# Patient Record
Sex: FEMALE | Race: WHITE | NOT HISPANIC OR LATINO | Employment: OTHER | URBAN - METROPOLITAN AREA
[De-identification: names, ages, dates, MRNs, and addresses within clinical notes are randomized per-mention and may not be internally consistent; named-entity substitution may affect disease eponyms.]

---

## 2018-03-23 DIAGNOSIS — H11.133 CONJUNCTIVAL MELANOSIS OF BOTH EYES: Primary | ICD-10-CM

## 2018-03-27 ENCOUNTER — TELEPHONE (OUTPATIENT)
Dept: OPHTHALMOLOGY | Facility: CLINIC | Age: 68
End: 2018-03-27

## 2018-05-02 ENCOUNTER — CLINICAL SUPPORT (OUTPATIENT)
Dept: OPHTHALMOLOGY | Facility: CLINIC | Age: 68
End: 2018-05-02
Attending: OPHTHALMOLOGY
Payer: MEDICARE

## 2018-05-02 DIAGNOSIS — H11.133 CONJUNCTIVAL MELANOSIS OF BOTH EYES: ICD-10-CM

## 2018-05-02 PROCEDURE — 92285 EXTERNAL OCULAR PHOTOGRAPHY: CPT | Mod: PBBFAC

## 2018-05-02 PROCEDURE — 92285 EXTERNAL OCULAR PHOTOGRAPHY: CPT | Mod: 26,S$PBB,, | Performed by: OPHTHALMOLOGY

## 2019-07-17 DIAGNOSIS — H11.132 CONJUNCTIVAL MELANOSIS OF LEFT EYE: Primary | ICD-10-CM

## 2019-10-09 ENCOUNTER — CLINICAL SUPPORT (OUTPATIENT)
Dept: OPHTHALMOLOGY | Facility: CLINIC | Age: 69
End: 2019-10-09
Attending: OPHTHALMOLOGY
Payer: MEDICARE

## 2019-10-09 DIAGNOSIS — H11.132 CONJUNCTIVAL MELANOSIS OF LEFT EYE: ICD-10-CM

## 2019-10-09 PROCEDURE — 92285 EXTERNAL PHOTOGRAPHY - OU - BOTH EYES: ICD-10-PCS | Mod: 26,S$PBB,, | Performed by: OPHTHALMOLOGY

## 2019-10-09 PROCEDURE — 92285 EXTERNAL OCULAR PHOTOGRAPHY: CPT | Mod: PBBFAC

## 2019-10-09 PROCEDURE — 92285 EXTERNAL OCULAR PHOTOGRAPHY: CPT | Mod: 26,S$PBB,, | Performed by: OPHTHALMOLOGY

## 2020-11-28 ENCOUNTER — OFFICE VISIT (OUTPATIENT)
Dept: URGENT CARE | Facility: CLINIC | Age: 70
End: 2020-11-28
Payer: MEDICARE

## 2020-11-28 VITALS
RESPIRATION RATE: 20 BRPM | HEART RATE: 83 BPM | TEMPERATURE: 98 F | SYSTOLIC BLOOD PRESSURE: 138 MMHG | OXYGEN SATURATION: 97 % | DIASTOLIC BLOOD PRESSURE: 65 MMHG

## 2020-11-28 DIAGNOSIS — R30.0 DYSURIA: ICD-10-CM

## 2020-11-28 DIAGNOSIS — N30.01 ACUTE CYSTITIS WITH HEMATURIA: Primary | ICD-10-CM

## 2020-11-28 LAB
BILIRUB UR QL STRIP: NEGATIVE
GLUCOSE UR QL STRIP: NEGATIVE
KETONES UR QL STRIP: NEGATIVE
LEUKOCYTE ESTERASE UR QL STRIP: POSITIVE
PH, POC UA: 7
POC BLOOD, URINE: POSITIVE
POC NITRATES, URINE: NEGATIVE
PROT UR QL STRIP: NEGATIVE
SP GR UR STRIP: 1.01 (ref 1–1.03)
UROBILINOGEN UR STRIP-ACNC: NORMAL (ref 0.1–1.1)

## 2020-11-28 PROCEDURE — 87077 CULTURE AEROBIC IDENTIFY: CPT

## 2020-11-28 PROCEDURE — 87186 SC STD MICRODIL/AGAR DIL: CPT

## 2020-11-28 PROCEDURE — 87088 URINE BACTERIA CULTURE: CPT

## 2020-11-28 PROCEDURE — 99203 OFFICE O/P NEW LOW 30 MIN: CPT | Mod: 25,S$GLB,, | Performed by: PHYSICIAN ASSISTANT

## 2020-11-28 PROCEDURE — 81003 URINALYSIS AUTO W/O SCOPE: CPT | Mod: QW,S$GLB,, | Performed by: PHYSICIAN ASSISTANT

## 2020-11-28 PROCEDURE — 87086 URINE CULTURE/COLONY COUNT: CPT

## 2020-11-28 PROCEDURE — 81003 POCT URINALYSIS, DIPSTICK, AUTOMATED, W/O SCOPE: ICD-10-PCS | Mod: QW,S$GLB,, | Performed by: PHYSICIAN ASSISTANT

## 2020-11-28 PROCEDURE — 99203 PR OFFICE/OUTPT VISIT, NEW, LEVL III, 30-44 MIN: ICD-10-PCS | Mod: 25,S$GLB,, | Performed by: PHYSICIAN ASSISTANT

## 2020-11-28 RX ORDER — CEPHALEXIN 500 MG/1
500 CAPSULE ORAL EVERY 12 HOURS
Qty: 14 CAPSULE | Refills: 0 | Status: SHIPPED | OUTPATIENT
Start: 2020-11-28 | End: 2020-12-05

## 2020-11-28 RX ORDER — PHENAZOPYRIDINE HYDROCHLORIDE 200 MG/1
200 TABLET, FILM COATED ORAL 3 TIMES DAILY PRN
Qty: 6 TABLET | Refills: 0 | Status: SHIPPED | OUTPATIENT
Start: 2020-11-28 | End: 2020-11-30

## 2020-11-28 NOTE — PROGRESS NOTES
Subjective:       Patient ID: Argentina Cardoza is a 70 y.o. female.    Vitals:  vitals were not taken for this visit.     Chief Complaint: No chief complaint on file.    Patient presents with UTI symptoms that started a few days ago.  Patient reports feeling of bladder spasms while avoiding couple days ago.  Patient drink a lot of water and symptoms had improved but now they are returned.  Patient also reports some urinary frequency and dysuria.  Denies any fever/chills, nausea/vomiting, hematuria.    Urinary Tract Infection   This is a new problem. The current episode started in the past 7 days. The problem occurs every urination. The problem has been unchanged. The quality of the pain is described as aching. The pain is at a severity of 4/10. The pain is mild. There has been no fever. There is no history of pyelonephritis. Associated symptoms include frequency and urgency. Pertinent negatives include no chills, discharge, flank pain, hematuria, hesitancy, nausea or vomiting. She has tried increased fluids for the symptoms. The treatment provided mild relief.       Constitution: Negative for chills.   Gastrointestinal: Negative for nausea and vomiting.   Genitourinary: Positive for frequency and urgency. Negative for flank pain and hematuria.       Objective:      Physical Exam   Constitutional: She is oriented to person, place, and time. She appears well-developed.   HENT:   Head: Normocephalic and atraumatic.   Ears:   Right Ear: External ear normal.   Left Ear: External ear normal.   Nose: Nose normal. No nasal deformity. No epistaxis.   Mouth/Throat: Oropharynx is clear and moist and mucous membranes are normal.   Eyes: Lids are normal.   Neck: Trachea normal, normal range of motion and phonation normal. Neck supple.   Cardiovascular: Normal pulses.   Pulmonary/Chest: Effort normal.   Abdominal: Soft. Normal appearance and bowel sounds are normal. She exhibits no distension. There is no abdominal  tenderness.   Neurological: She is alert and oriented to person, place, and time.   Skin: Skin is warm, dry and intact. Psychiatric: Her speech is normal and behavior is normal.   Nursing note and vitals reviewed.  ,  Results for orders placed or performed in visit on 11/28/20   POCT Urinalysis, Dipstick, Automated, W/O Scope   Result Value Ref Range    POC Blood, Urine Positive (A) Negative    POC Bilirubin, Urine Negative Negative    POC Urobilinogen, Urine normal 0.1 - 1.1    POC Ketones, Urine Negative Negative    POC Protein, Urine Negative Negative    POC Nitrates, Urine Negative Negative    POC Glucose, Urine Negative Negative    pH, UA 7.0     POC Specific Gravity, Urine 1.010 1.003 - 1.029    POC Leukocytes, Urine Positive (A) Negative           Assessment:       1. Acute cystitis with hematuria    2. Dysuria        Plan:       Pt instructed to take full course of antibiotics. Antibiotics may need to be adjusted pending urine culture results. Pt advised to drink plenty of fluids and avoid caffeine or sugary beverages. Recommend to rtc or follow up with primary care provider if symptoms do not improve within 2-3 days or sooner for any new or worsening symptoms.     Acute cystitis with hematuria  -     Urine culture  -     cephALEXin (KEFLEX) 500 MG capsule; Take 1 capsule (500 mg total) by mouth every 12 (twelve) hours. for 7 days  Dispense: 14 capsule; Refill: 0  -     phenazopyridine (PYRIDIUM) 200 MG tablet; Take 1 tablet (200 mg total) by mouth 3 (three) times daily as needed for Pain.  Dispense: 6 tablet; Refill: 0    Dysuria  -     POCT Urinalysis, Dipstick, Automated, W/O Scope  -     phenazopyridine (PYRIDIUM) 200 MG tablet; Take 1 tablet (200 mg total) by mouth 3 (three) times daily as needed for Pain.  Dispense: 6 tablet; Refill: 0      Patient Instructions     Bladder Infection, Female (Adult)    Urine is normally doesn't have any bacteria in it. But bacteria can get into the urinary tract from  "the skin around the rectum. Or they can travel in the blood from elsewhere in the body. Once they are in your urinary tract, they can cause infection in the urethra (urethritis), the bladder (cystitis), or the kidneys (pyelonephritis).  The most common place for an infection is in the bladder. This is called a bladder infection. This is one of the most common infections in women. Most bladder infections are easily treated. They are not serious unless the infection spreads to the kidney.  The phrases "bladder infection," "UTI," and "cystitis" are often used to describe the same thing. But they are not always the same. Cystitis is an inflammation of the bladder. The most common cause of cystitis is an infection.  Symptoms  The infection causes inflammation in the urethra and bladder. This causes many of the symptoms. The most common symptoms of a bladder infection are:  · Pain or burning when urinating  · Having to urinate more often than usual  · Urgent need to urinate  · Only a small amount of urine comes out  · Blood in urine  · Abdominal discomfort. This is usually in the lower abdomen above the pubic bone.  · Cloudy urine  · Strong- or bad-smelling urine  · Unable to urinate (urinary retention)  · Unable to hold urine in (urinary incontinence)  · Fever  · Loss of appetite  · Confusion (in older adults)  Causes  Bladder infections are not contagious. You can't get one from someone else, from a toilet seat, or from sharing a bath.  The most common cause of bladder infections is bacteria from the bowels. The bacteria get onto the skin around the opening of the urethra. From there, they can get into the urine and travel up to the bladder, causing inflammation and infection. This usually happens because of:  · Wiping improperly after urinating. Always wipe from front to back.  · Bowel incontinence  · Pregnancy  · Procedures such as having a catheter inserted  · Older age  · Not emptying your bladder. This can allow " bacteria a chance to grow in your urine.  · Dehydration  · Constipation  · Sex  · Use of a diaphragm for birth control   Treatment  Bladder infections are diagnosed by a urine test. They are treated with antibiotics and usually clear up quickly without complications. Treatment helps prevent a more serious kidney infection.  Medicines  Medicines can help in the treatment of a bladder infection:  · Take antibiotics until they are used up, even if you feel better. It is important to finish them to make sure the infection has cleared.  · You can use acetaminophen or ibuprofen for pain, fever, or discomfort, unless another medicine was prescribed. If you have chronic liver or kidney disease, talk with your healthcare provider before using these medicines. Also talk with your provider if you've ever had a stomach ulcer or gastrointestinal bleeding, or are taking blood-thinner medicines.  · If you are given phenazopydridine to reduce burning with urination, it will cause your urine to become a bright orange color. This can stain clothing.  Care and prevention  These self-care steps can help prevent future infections:  · Drink plenty of fluids to prevent dehydration and flush out your bladder. Do this unless you must restrict fluids for other health reasons, or your doctor told you not to.  · Proper cleaning after going to the bathroom is important. Wipe from front to back after using the toilet to prevent the spread of bacteria.  · Urinate more often. Don't try to hold urine in for a long time.  · Wear loose-fitting clothes and cotton underwear. Avoid tight-fitting pants.  · Improve your diet and prevent constipation. Eat more fresh fruit and vegetables, and fiber, and less junk and fatty foods.  · Avoid sex until your symptoms are gone.  · Avoid caffeine, alcohol, and spicy foods. These can irritate your bladder.  · Urinate right after intercourse to flush out your bladder.  · If you use birth control pills and have  frequent bladder infections, discuss it with your doctor.  Follow-up care  Call your healthcare provider if all symptoms are not gone after 3 days of treatment. This is especially important if you have repeat infections.  If a culture was done, you will be told if your treatment needs to be changed. If directed, you can call to find out the results.  If X-rays were done, you will be told if the results will affect your treatment.  Call 911  Call 911 if any of the following occur:  · Trouble breathing  · Hard to wake up or confusion  · Fainting or loss of consciousness  · Rapid heart rate  When to seek medical advice  Call your healthcare provider right away if any of these occur:  · Fever of 100.4ºF (38.0ºC) or higher, or as directed by your healthcare provider  · Symptoms are not better by the third day of treatment  · Back or belly (abdominal) pain that gets worse  · Repeated vomiting, or unable to keep medicine down  · Weakness or dizziness  · Vaginal discharge  · Pain, redness, or swelling in the outer vaginal area (labia)  Date Last Reviewed: 10/1/2016  © 8510-6417 [employer]. [address] [place] [address]. All rights reserved. This information is not intended as a substitute for professional medical care. Always follow your healthcare professional's instructions.                 UTI   If your condition worsens or fails to improve we recommend that you receive another evaluation at the ER immediately or contact your PCP to discuss your concerns or return here. You must understand that you've received an urgent care treatment only and that you may be released before all your medical problems are known or treated. You the patient will arrange for followup care as instructed.   If you had cultures done it will take 3-5 days to result. We will call you with the result.   If you are are female and on BCP use additional methods to prevent pregnancy while on the antibiotics and for one cycle  after.   Cranberry juice may help. Get the 100% cranberry juice and mix 4 oz of juice with 4 oz of water and drink this 8 oz glass of liquid once a day

## 2020-11-28 NOTE — PATIENT INSTRUCTIONS
"  Bladder Infection, Female (Adult)    Urine is normally doesn't have any bacteria in it. But bacteria can get into the urinary tract from the skin around the rectum. Or they can travel in the blood from elsewhere in the body. Once they are in your urinary tract, they can cause infection in the urethra (urethritis), the bladder (cystitis), or the kidneys (pyelonephritis).  The most common place for an infection is in the bladder. This is called a bladder infection. This is one of the most common infections in women. Most bladder infections are easily treated. They are not serious unless the infection spreads to the kidney.  The phrases "bladder infection," "UTI," and "cystitis" are often used to describe the same thing. But they are not always the same. Cystitis is an inflammation of the bladder. The most common cause of cystitis is an infection.  Symptoms  The infection causes inflammation in the urethra and bladder. This causes many of the symptoms. The most common symptoms of a bladder infection are:  · Pain or burning when urinating  · Having to urinate more often than usual  · Urgent need to urinate  · Only a small amount of urine comes out  · Blood in urine  · Abdominal discomfort. This is usually in the lower abdomen above the pubic bone.  · Cloudy urine  · Strong- or bad-smelling urine  · Unable to urinate (urinary retention)  · Unable to hold urine in (urinary incontinence)  · Fever  · Loss of appetite  · Confusion (in older adults)  Causes  Bladder infections are not contagious. You can't get one from someone else, from a toilet seat, or from sharing a bath.  The most common cause of bladder infections is bacteria from the bowels. The bacteria get onto the skin around the opening of the urethra. From there, they can get into the urine and travel up to the bladder, causing inflammation and infection. This usually happens because of:  · Wiping improperly after urinating. Always wipe from front to " back.  · Bowel incontinence  · Pregnancy  · Procedures such as having a catheter inserted  · Older age  · Not emptying your bladder. This can allow bacteria a chance to grow in your urine.  · Dehydration  · Constipation  · Sex  · Use of a diaphragm for birth control   Treatment  Bladder infections are diagnosed by a urine test. They are treated with antibiotics and usually clear up quickly without complications. Treatment helps prevent a more serious kidney infection.  Medicines  Medicines can help in the treatment of a bladder infection:  · Take antibiotics until they are used up, even if you feel better. It is important to finish them to make sure the infection has cleared.  · You can use acetaminophen or ibuprofen for pain, fever, or discomfort, unless another medicine was prescribed. If you have chronic liver or kidney disease, talk with your healthcare provider before using these medicines. Also talk with your provider if you've ever had a stomach ulcer or gastrointestinal bleeding, or are taking blood-thinner medicines.  · If you are given phenazopydridine to reduce burning with urination, it will cause your urine to become a bright orange color. This can stain clothing.  Care and prevention  These self-care steps can help prevent future infections:  · Drink plenty of fluids to prevent dehydration and flush out your bladder. Do this unless you must restrict fluids for other health reasons, or your doctor told you not to.  · Proper cleaning after going to the bathroom is important. Wipe from front to back after using the toilet to prevent the spread of bacteria.  · Urinate more often. Don't try to hold urine in for a long time.  · Wear loose-fitting clothes and cotton underwear. Avoid tight-fitting pants.  · Improve your diet and prevent constipation. Eat more fresh fruit and vegetables, and fiber, and less junk and fatty foods.  · Avoid sex until your symptoms are gone.  · Avoid caffeine, alcohol, and spicy  foods. These can irritate your bladder.  · Urinate right after intercourse to flush out your bladder.  · If you use birth control pills and have frequent bladder infections, discuss it with your doctor.  Follow-up care  Call your healthcare provider if all symptoms are not gone after 3 days of treatment. This is especially important if you have repeat infections.  If a culture was done, you will be told if your treatment needs to be changed. If directed, you can call to find out the results.  If X-rays were done, you will be told if the results will affect your treatment.  Call 911  Call 911 if any of the following occur:  · Trouble breathing  · Hard to wake up or confusion  · Fainting or loss of consciousness  · Rapid heart rate  When to seek medical advice  Call your healthcare provider right away if any of these occur:  · Fever of 100.4ºF (38.0ºC) or higher, or as directed by your healthcare provider  · Symptoms are not better by the third day of treatment  · Back or belly (abdominal) pain that gets worse  · Repeated vomiting, or unable to keep medicine down  · Weakness or dizziness  · Vaginal discharge  · Pain, redness, or swelling in the outer vaginal area (labia)  Date Last Reviewed: 10/1/2016  © 0471-0165 [employer]. [address] [place] [address], [place] [address]. All rights reserved. This information is not intended as a substitute for professional medical care. Always follow your healthcare professional's instructions.                 UTI   If your condition worsens or fails to improve we recommend that you receive another evaluation at the ER immediately or contact your PCP to discuss your concerns or return here. You must understand that you've received an urgent care treatment only and that you may be released before all your medical problems are known or treated. You the patient will arrange for followup care as instructed.   If you had cultures done it will take 3-5 days to result. We will  call you with the result.   If you are are female and on BCP use additional methods to prevent pregnancy while on the antibiotics and for one cycle after.   Cranberry juice may help. Get the 100% cranberry juice and mix 4 oz of juice with 4 oz of water and drink this 8 oz glass of liquid once a day

## 2020-11-30 LAB — BACTERIA UR CULT: ABNORMAL

## 2021-01-27 ENCOUNTER — OFFICE VISIT (OUTPATIENT)
Dept: URGENT CARE | Facility: CLINIC | Age: 71
End: 2021-01-27
Payer: MEDICARE

## 2021-01-27 ENCOUNTER — HOSPITAL ENCOUNTER (OUTPATIENT)
Dept: RADIOLOGY | Facility: CLINIC | Age: 71
Discharge: HOME | End: 2021-01-27
Attending: PHYSICIAN ASSISTANT
Payer: MEDICARE

## 2021-01-27 VITALS
HEIGHT: 62 IN | WEIGHT: 108 LBS | BODY MASS INDEX: 19.88 KG/M2 | OXYGEN SATURATION: 99 % | TEMPERATURE: 99 F | DIASTOLIC BLOOD PRESSURE: 64 MMHG | HEART RATE: 87 BPM | SYSTOLIC BLOOD PRESSURE: 129 MMHG | RESPIRATION RATE: 20 BRPM

## 2021-01-27 DIAGNOSIS — R14.0 GASSINESS: ICD-10-CM

## 2021-01-27 DIAGNOSIS — R10.9 ABDOMINAL CRAMPING: Primary | ICD-10-CM

## 2021-01-27 DIAGNOSIS — R11.0 NAUSEA: ICD-10-CM

## 2021-01-27 DIAGNOSIS — R10.13 EPIGASTRIC ABDOMINAL PAIN: ICD-10-CM

## 2021-01-27 PROCEDURE — 99214 OFFICE O/P EST MOD 30 MIN: CPT | Mod: S$GLB,,, | Performed by: PHYSICIAN ASSISTANT

## 2021-01-27 PROCEDURE — 99214 PR OFFICE/OUTPT VISIT, EST, LEVL IV, 30-39 MIN: ICD-10-PCS | Mod: S$GLB,,, | Performed by: PHYSICIAN ASSISTANT

## 2021-01-27 PROCEDURE — 74019 RADEX ABDOMEN 2 VIEWS: CPT | Mod: S$GLB,,, | Performed by: RADIOLOGY

## 2021-01-27 PROCEDURE — 74019 XR ABDOMEN FLAT AND ERECT: ICD-10-PCS | Mod: S$GLB,,, | Performed by: RADIOLOGY

## 2021-01-27 RX ORDER — MELOXICAM 15 MG/1
15 TABLET ORAL DAILY
COMMUNITY
Start: 2020-12-12

## 2021-01-27 RX ORDER — ROSUVASTATIN CALCIUM 5 MG/1
TABLET, COATED ORAL
COMMUNITY
Start: 2021-01-11

## 2021-01-27 RX ORDER — KETOCONAZOLE 20 MG/ML
SHAMPOO, SUSPENSION TOPICAL
COMMUNITY
Start: 2021-01-25

## 2021-01-27 RX ORDER — SIMETHICONE 125 MG
125 CAPSULE ORAL 4 TIMES DAILY PRN
Qty: 28 CAPSULE | Refills: 0 | Status: SHIPPED | OUTPATIENT
Start: 2021-01-27 | End: 2023-03-07

## 2021-01-27 RX ORDER — ONDANSETRON 4 MG/1
4 TABLET, ORALLY DISINTEGRATING ORAL EVERY 6 HOURS PRN
Qty: 30 TABLET | Refills: 0 | Status: SHIPPED | OUTPATIENT
Start: 2021-01-27 | End: 2023-03-07

## 2021-01-27 RX ORDER — DICYCLOMINE HYDROCHLORIDE 10 MG/1
10 CAPSULE ORAL
Qty: 28 CAPSULE | Refills: 0 | Status: SHIPPED | OUTPATIENT
Start: 2021-01-27 | End: 2021-02-03

## 2021-02-09 DIAGNOSIS — H11.9 CONJUNCTIVAL LESION: Primary | ICD-10-CM

## 2021-02-26 ENCOUNTER — CLINICAL SUPPORT (OUTPATIENT)
Dept: OPHTHALMOLOGY | Facility: CLINIC | Age: 71
End: 2021-02-26
Attending: OPHTHALMOLOGY
Payer: MEDICARE

## 2021-02-26 DIAGNOSIS — H11.9 CONJUNCTIVAL LESION: ICD-10-CM

## 2021-02-26 PROCEDURE — 92285 EXTERNAL PHOTOGRAPHY - OU - BOTH EYES: ICD-10-PCS | Mod: 26,S$PBB,, | Performed by: OPHTHALMOLOGY

## 2021-02-26 PROCEDURE — 92285 EXTERNAL OCULAR PHOTOGRAPHY: CPT | Mod: PBBFAC

## 2021-02-26 PROCEDURE — 92285 EXTERNAL OCULAR PHOTOGRAPHY: CPT | Mod: 26,S$PBB,, | Performed by: OPHTHALMOLOGY

## 2021-04-28 ENCOUNTER — PATIENT MESSAGE (OUTPATIENT)
Dept: RESEARCH | Facility: HOSPITAL | Age: 71
End: 2021-04-28

## 2022-02-04 ENCOUNTER — TELEPHONE (OUTPATIENT)
Dept: OPHTHALMOLOGY | Facility: CLINIC | Age: 72
End: 2022-02-04
Payer: COMMERCIAL

## 2022-02-04 NOTE — TELEPHONE ENCOUNTER
Returned phone message--L/m stating I haven't received orders for testing from [facility]. Advised her to give there office a call in regards to orders. [email or web address]

## 2022-02-04 NOTE — TELEPHONE ENCOUNTER
----- Message from Barbara Kerr sent at 2/3/2022  9:34 AM CST -----  Contact:  [phone]  This is a patient of Dr. Monteiro. She is calling to schedule testing with you. Please call her back when you are available. [phone]

## 2022-02-11 ENCOUNTER — TELEPHONE (OUTPATIENT)
Dept: OPHTHALMOLOGY | Facility: CLINIC | Age: 72
End: 2022-02-11
Payer: COMMERCIAL

## 2022-02-11 DIAGNOSIS — H11.9 CONJUNCTIVAL LESION: Primary | ICD-10-CM

## 2022-02-23 ENCOUNTER — CLINICAL SUPPORT (OUTPATIENT)
Dept: OPHTHALMOLOGY | Facility: CLINIC | Age: 72
End: 2022-02-23
Payer: MEDICARE

## 2022-02-23 DIAGNOSIS — H11.9 CONJUNCTIVAL LESION: ICD-10-CM

## 2022-02-23 PROCEDURE — 92285 EXTERNAL PHOTOGRAPHY - OU - BOTH EYES: ICD-10-PCS | Mod: 26,S$PBB,, | Performed by: OPHTHALMOLOGY

## 2022-02-23 PROCEDURE — 92285 EXTERNAL OCULAR PHOTOGRAPHY: CPT | Mod: 26,S$PBB,, | Performed by: OPHTHALMOLOGY

## 2022-02-23 PROCEDURE — 92285 EXTERNAL OCULAR PHOTOGRAPHY: CPT | Mod: PBBFAC

## 2022-08-30 PROBLEM — M85.89 OSTEOPENIA OF MULTIPLE SITES: Status: ACTIVE | Noted: 2022-08-30

## 2022-08-30 PROBLEM — E78.5 HYPERLIPIDEMIA: Status: ACTIVE | Noted: 2022-08-30

## 2023-03-07 ENCOUNTER — OFFICE VISIT (OUTPATIENT)
Dept: URGENT CARE | Facility: CLINIC | Age: 73
End: 2023-03-07
Payer: MEDICARE

## 2023-03-07 VITALS
BODY MASS INDEX: 25.42 KG/M2 | RESPIRATION RATE: 16 BRPM | HEIGHT: 56 IN | OXYGEN SATURATION: 100 % | DIASTOLIC BLOOD PRESSURE: 76 MMHG | HEART RATE: 70 BPM | WEIGHT: 113 LBS | SYSTOLIC BLOOD PRESSURE: 158 MMHG | TEMPERATURE: 98 F

## 2023-03-07 DIAGNOSIS — N30.91 CYSTITIS WITH HEMATURIA: Primary | ICD-10-CM

## 2023-03-07 DIAGNOSIS — R03.0 ELEVATED BLOOD PRESSURE READING IN OFFICE WITHOUT DIAGNOSIS OF HYPERTENSION: ICD-10-CM

## 2023-03-07 DIAGNOSIS — R30.0 DYSURIA: ICD-10-CM

## 2023-03-07 DIAGNOSIS — R81 GLYCOSURIA: ICD-10-CM

## 2023-03-07 LAB
BILIRUB UR QL STRIP: NEGATIVE
GLUCOSE SERPL-MCNC: 66 MG/DL (ref 70–110)
GLUCOSE UR QL STRIP: POSITIVE
KETONES UR QL STRIP: NEGATIVE
LEUKOCYTE ESTERASE UR QL STRIP: NEGATIVE
PH, POC UA: 5.5
POC BLOOD, URINE: POSITIVE
POC NITRATES, URINE: NEGATIVE
PROT UR QL STRIP: NEGATIVE
SP GR UR STRIP: >=1.005 (ref 1–1.03)
UROBILINOGEN UR STRIP-ACNC: NORMAL (ref 0.1–1.1)

## 2023-03-07 PROCEDURE — 81003 POCT URINALYSIS, DIPSTICK, AUTOMATED, W/O SCOPE: ICD-10-PCS | Mod: QW,S$GLB,, | Performed by: NURSE PRACTITIONER

## 2023-03-07 PROCEDURE — 81003 URINALYSIS AUTO W/O SCOPE: CPT | Mod: QW,S$GLB,, | Performed by: NURSE PRACTITIONER

## 2023-03-07 PROCEDURE — 99214 OFFICE O/P EST MOD 30 MIN: CPT | Mod: S$GLB,,, | Performed by: NURSE PRACTITIONER

## 2023-03-07 PROCEDURE — 99214 PR OFFICE/OUTPT VISIT, EST, LEVL IV, 30-39 MIN: ICD-10-PCS | Mod: S$GLB,,, | Performed by: NURSE PRACTITIONER

## 2023-03-07 PROCEDURE — 82962 GLUCOSE BLOOD TEST: CPT | Mod: S$GLB,,, | Performed by: NURSE PRACTITIONER

## 2023-03-07 PROCEDURE — 87086 URINE CULTURE/COLONY COUNT: CPT | Performed by: NURSE PRACTITIONER

## 2023-03-07 PROCEDURE — 82962 POCT GLUCOSE, HAND-HELD DEVICE: ICD-10-PCS | Mod: S$GLB,,, | Performed by: NURSE PRACTITIONER

## 2023-03-07 RX ORDER — PHENAZOPYRIDINE HYDROCHLORIDE 100 MG/1
100 TABLET, FILM COATED ORAL 3 TIMES DAILY PRN
Qty: 6 TABLET | Refills: 0 | Status: SHIPPED | OUTPATIENT
Start: 2023-03-07 | End: 2023-03-09

## 2023-03-07 RX ORDER — ESCITALOPRAM OXALATE 5 MG/1
5 TABLET ORAL DAILY
COMMUNITY

## 2023-03-07 RX ORDER — CEPHALEXIN 500 MG/1
500 CAPSULE ORAL EVERY 12 HOURS
Qty: 14 CAPSULE | Refills: 0 | Status: SHIPPED | OUTPATIENT
Start: 2023-03-07 | End: 2023-03-14

## 2023-03-07 NOTE — PATIENT INSTRUCTIONS
PLEASE READ YOUR DISCHARGE INSTRUCTIONS ENTIRELY AS IT CONTAINS IMPORTANT INFORMATION.      Take the antibiotics to completion. Start a OTC probiotic while taking antibiotics to help replenish your GI mohini affected by antibiotic use.     Drink plenty of fluids, avoid caffeine and/or sugary beverages. wipe front to back, take showers not baths, no scented soaps, wear breathable cotton underwear, urinate after sexual intercourse and avoid holding your urination for prolonged periods at a time.     A urine culture was sent. You will be contacted once it results and appropriate action will be taken if needed.     If prescribed pyridium Take the pyridium three times a day with meals. It will turn your urine orange. You do not need to take the whole prescription you can stop this once the pain is better and finish out the antibiotics    Please go to the ER for worsening symptoms including fever, worsening flank pain, vomiting, etc.       Please return or see your primary care doctor if you develop new or worsening symptoms.   Elevated Blood Pressure  Your blood pressure was elevated during your visit to the urgent care.  It was not so high that immediate care was needed but it is recommended that you monitor your blood pressure over the next week or two to make sure that it is not staying elevated.  Please have your blood pressure taken 2-3 times daily at different times of the day.  Write all of those blood pressures down and record the time that they were taken.  Keep all that information and take it with you to see your Primary Care Physician.  If your blood pressure is consistently above 140/90 you will need to follow up with your PCP more quickly    Please arrange follow up with your primary medical clinic as soon as possible. You must understand that you've received an Urgent Care treatment only and that you may be released before all of your medical problems are known or treated. You, the patient, will arrange for  follow up as instructed. If your symptoms worsen or fail to improve you should go to the Emergency Room.  WE CANNOT RULE OUT ALL POSSIBLE CAUSES OF YOUR SYMPTOMS IN THE URGENT CARE SETTING PLEASE GO TO THE ER IF YOU FEELS YOUR CONDITION IS WORSENING OR YOU WOULD LIKE EMERGENT EVALUATION.

## 2023-03-07 NOTE — PROGRESS NOTES
"Subjective:       Patient ID: Argentina Cardoza is a 72 y.o. female.    Vitals:  height is 4' 8" (1.422 m) and weight is 51.3 kg (113 lb). Her temperature is 97.7 °F (36.5 °C). Her blood pressure is 158/76 (abnormal) and her pulse is 70. Her respiration is 16 and oxygen saturation is 100%.     Chief Complaint: Dysuria    Argentina Cardoza is a 72 year old female whom presents to urgent care with complaints of urinary hesitancy and dysuria that began today. Patient reports her PCP suggest she gets a urine culture.     Dysuria   This is a new problem. The current episode started today. The quality of the pain is described as aching (spasm pain). The pain is at a severity of 7/10. There has been no fever. She is Sexually active. There is A history of pyelonephritis. Associated symptoms include frequency, urgency, bubble bath use, constipation (history of constipation) and withholding. Pertinent negatives include no discharge, flank pain, hematuria, hesitancy, nausea or vomiting. She has tried nothing for the symptoms. Her past medical history is significant for recurrent UTIs, a single kidney and a urological procedure (hysterectomy). There is no history of catheterization, diabetes insipidus, diabetes mellitus, hypertension, kidney stones or STD.     Gastrointestinal:  Positive for constipation (history of constipation). Negative for nausea and vomiting.   Genitourinary:  Positive for dysuria, frequency and urgency. Negative for flank pain and hematuria.     Results for orders placed or performed in visit on 03/07/23   POCT Urinalysis, Dipstick, Automated, W/O Scope   Result Value Ref Range    POC Blood, Urine Positive (A) Negative    POC Bilirubin, Urine Negative Negative    POC Urobilinogen, Urine normal 0.1 - 1.1    POC Ketones, Urine Negative Negative    POC Protein, Urine Negative Negative    POC Nitrates, Urine Negative Negative    POC Glucose, Urine Positive (A) Negative    pH, UA 5.5     POC Specific " Gravity, Urine >=1.005 1.003 - 1.029    POC Leukocytes, Urine Negative Negative   POCT Glucose, Hand-Held Device   Result Value Ref Range    POC Glucose 66 (A) 70 - 110 MG/DL       Objective:      Physical Exam   Constitutional: She is oriented to person, place, and time. She appears well-developed. She is cooperative.  Non-toxic appearance. She does not appear ill. No distress.   HENT:   Head: Normocephalic and atraumatic.   Ears:   Right Ear: Hearing, tympanic membrane, external ear and ear canal normal.   Left Ear: Hearing, tympanic membrane, external ear and ear canal normal.   Nose: Nose normal. No mucosal edema, rhinorrhea or nasal deformity. No epistaxis. Right sinus exhibits no maxillary sinus tenderness and no frontal sinus tenderness. Left sinus exhibits no maxillary sinus tenderness and no frontal sinus tenderness.   Mouth/Throat: Uvula is midline, oropharynx is clear and moist and mucous membranes are normal. No trismus in the jaw. Normal dentition. No uvula swelling. No oropharyngeal exudate, posterior oropharyngeal edema or posterior oropharyngeal erythema.   Eyes: Conjunctivae and lids are normal. No scleral icterus.   Neck: Trachea normal and phonation normal. Neck supple. No edema present. No erythema present. No neck rigidity present.   Cardiovascular: Normal rate, regular rhythm, normal heart sounds and normal pulses.   Pulmonary/Chest: Effort normal and breath sounds normal. No respiratory distress. She has no decreased breath sounds. She has no rhonchi.   Abdominal: Normal appearance. She exhibits no distension. There is no abdominal tenderness. There is no left CVA tenderness and no right CVA tenderness.   Musculoskeletal: Normal range of motion.         General: No deformity. Normal range of motion.   Neurological: She is alert and oriented to person, place, and time. She exhibits normal muscle tone. Coordination normal.   Skin: Skin is warm, dry, intact, not diaphoretic and not pale.    Psychiatric: Her speech is normal and behavior is normal. Judgment and thought content normal.   Nursing note and vitals reviewed.      Assessment:       1. Cystitis with hematuria    2. Elevated blood pressure reading in office without diagnosis of hypertension    3. Dysuria    4. Glycosuria          Plan:         Cystitis with hematuria  -     cephALEXin (KEFLEX) 500 MG capsule; Take 1 capsule (500 mg total) by mouth every 12 (twelve) hours. for 7 days  Dispense: 14 capsule; Refill: 0    Elevated blood pressure reading in office without diagnosis of hypertension    Dysuria  -     POCT Urinalysis, Dipstick, Automated, W/O Scope  -     Urine culture  -     phenazopyridine (PYRIDIUM) 100 MG tablet; Take 1 tablet (100 mg total) by mouth 3 (three) times daily as needed for Pain.  Dispense: 6 tablet; Refill: 0    Glycosuria  -     POCT Glucose, Hand-Held Device           Medical Decision Making:   Differential Diagnosis:   UTI cystitis, pyelonephritis, dysuria, STD, PID, Vaginitis, Urethritis, Painful bladder syndrome, Vaginal yeast infection,     Clinical Tests:   Lab Tests: Ordered and Reviewed       <> Summary of Lab: Results for orders placed or performed in visit on 03/07/23  POCT Urinalysis, Dipstick, Automated, W/O Scope  Result Value Ref Range   POC Blood, Urine Positive (A) Negative   POC Glucose, Urine Positive (A) Negative   pH, UA 5.5    POC Leukocytes, Urine Negative Negative  POCT Glucose, Hand-Held Device  Result Value Ref Range   POC Glucose 66 (A) 70 - 110 MG/DL          Urgent Care Management:  Previous encounters and labs were reviewed. Reviewed abnormal urinalysis with patient who verbalized understanding.  Discussed diagnosis and treatment plan while  also discussed over-the-counter medication remedies to help support current symptoms.Patient instructed to take full course of antibiotics.  Antibiotics may need to be adjusted pending urine culture results. Patient advised to drink plenty of fluids  and avoid caffeine or sugary beverages. Recommend to  follow up with primary care provider if symptoms do not improve within 2-3 days or sooner for any new or worsening symptoms. Patient reports symptoms are consistent with her symptoms she experience when she has a UTI. Additional plan of care as outlined above. Discussed the importance of a low sodium/ heart healthy diet and exercise to achieve overall optimal health. Patient reports her blood pressure is usually within normal limits while at home.  Patient instructed to keep a blood pressure log and bring in to her next appointment with her primary care provider. Patient instructed to follow up sooner if her blood pressure is consistently greater than 140/90. Patient educational handout also included in discharge paperwork and was given to patient who verbalized understanding and agrees with the plan of care.  Patient denies any further questions or concerns at this time.  Patient exits exam room in no acute distress.        Patient Instructions   PLEASE READ YOUR DISCHARGE INSTRUCTIONS ENTIRELY AS IT CONTAINS IMPORTANT INFORMATION.      Take the antibiotics to completion. Start a OTC probiotic while taking antibiotics to help replenish your GI mohini affected by antibiotic use.     Drink plenty of fluids, avoid caffeine and/or sugary beverages. wipe front to back, take showers not baths, no scented soaps, wear breathable cotton underwear, urinate after sexual intercourse and avoid holding your urination for prolonged periods at a time.     A urine culture was sent. You will be contacted once it results and appropriate action will be taken if needed.     If prescribed pyridium Take the pyridium three times a day with meals. It will turn your urine orange. You do not need to take the whole prescription you can stop this once the pain is better and finish out the antibiotics    Please go to the ER for worsening symptoms including fever, worsening flank pain,  vomiting, etc.       Please return or see your primary care doctor if you develop new or worsening symptoms.   Elevated Blood Pressure  Your blood pressure was elevated during your visit to the urgent care.  It was not so high that immediate care was needed but it is recommended that you monitor your blood pressure over the next week or two to make sure that it is not staying elevated.  Please have your blood pressure taken 2-3 times daily at different times of the day.  Write all of those blood pressures down and record the time that they were taken.  Keep all that information and take it with you to see your Primary Care Physician.  If your blood pressure is consistently above 140/90 you will need to follow up with your PCP more quickly    Please arrange follow up with your primary medical clinic as soon as possible. You must understand that you've received an Urgent Care treatment only and that you may be released before all of your medical problems are known or treated. You, the patient, will arrange for follow up as instructed. If your symptoms worsen or fail to improve you should go to the Emergency Room.  WE CANNOT RULE OUT ALL POSSIBLE CAUSES OF YOUR SYMPTOMS IN THE URGENT CARE SETTING PLEASE GO TO THE ER IF YOU FEELS YOUR CONDITION IS WORSENING OR YOU WOULD LIKE EMERGENT EVALUATION.

## 2023-03-09 ENCOUNTER — TELEPHONE (OUTPATIENT)
Dept: URGENT CARE | Facility: CLINIC | Age: 73
End: 2023-03-09
Payer: COMMERCIAL

## 2023-03-09 LAB — BACTERIA UR CULT: NO GROWTH

## 2023-03-09 NOTE — TELEPHONE ENCOUNTER
Discussed negative urine cultures with patient today.  Discussed need follow-up with PCP for continued care.  Discussed no need to continue taking oral antibiotics at this time as there is no further need.  Patient verbalized understanding and agrees with plan.  Patient has been followed by PCP for Urology complains up into this point and encouraged to continue care. Patient verbalized understanding and agrees with plan.

## 2023-06-23 ENCOUNTER — HOSPITAL ENCOUNTER (OUTPATIENT)
Dept: RADIOLOGY | Facility: CLINIC | Age: 73
Discharge: HOME | End: 2023-06-23
Attending: PHYSICIAN ASSISTANT
Payer: MEDICARE

## 2023-06-23 ENCOUNTER — OFFICE VISIT (OUTPATIENT)
Dept: URGENT CARE | Facility: CLINIC | Age: 73
End: 2023-06-23
Payer: MEDICARE

## 2023-06-23 ENCOUNTER — PATIENT MESSAGE (OUTPATIENT)
Dept: URGENT CARE | Facility: CLINIC | Age: 73
End: 2023-06-23

## 2023-06-23 VITALS
DIASTOLIC BLOOD PRESSURE: 67 MMHG | SYSTOLIC BLOOD PRESSURE: 126 MMHG | TEMPERATURE: 98 F | WEIGHT: 113 LBS | HEIGHT: 56 IN | RESPIRATION RATE: 16 BRPM | OXYGEN SATURATION: 100 % | BODY MASS INDEX: 25.42 KG/M2 | HEART RATE: 68 BPM
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
## 2023-06-23 DIAGNOSIS — T07.XXXA ABRASIONS OF MULTIPLE SITES: ICD-10-CM
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
## 2023-06-23 DIAGNOSIS — S32.512A CLOSED FRACTURE OF SUPERIOR RAMUS OF LEFT PUBIS, INITIAL ENCOUNTER: ICD-10-CM

## 2023-06-23 DIAGNOSIS — S76.012A HIP STRAIN, LEFT, INITIAL ENCOUNTER: ICD-10-CM

## 2023-06-23 DIAGNOSIS — S43.402A SPRAIN OF LEFT SHOULDER, UNSPECIFIED SHOULDER SPRAIN TYPE, INITIAL ENCOUNTER: ICD-10-CM

## 2023-06-23 DIAGNOSIS — S51.012A LACERATION OF LEFT ELBOW, INITIAL ENCOUNTER: ICD-10-CM

## 2023-06-23 PROCEDURE — 73080 XR ELBOW COMPLETE 3 VIEW LEFT: ICD-10-PCS | Mod: LT,S$GLB,, | Performed by: RADIOLOGY

## 2023-06-23 PROCEDURE — 99214 OFFICE O/P EST MOD 30 MIN: CPT | Mod: 25,S$GLB,, | Performed by: PHYSICIAN ASSISTANT

## 2023-06-23 PROCEDURE — 12031 INTMD RPR S/A/T/EXT 2.5 CM/<: CPT | Mod: S$GLB,,, | Performed by: PHYSICIAN ASSISTANT

## 2023-06-23 PROCEDURE — 99214 PR OFFICE/OUTPT VISIT, EST, LEVL IV, 30-39 MIN: ICD-10-PCS | Mod: 25,S$GLB,, | Performed by: PHYSICIAN ASSISTANT

## 2023-06-23 PROCEDURE — 73502 X-RAY EXAM HIP UNI 2-3 VIEWS: CPT | Mod: LT,S$GLB,, | Performed by: RADIOLOGY

## 2023-06-23 PROCEDURE — 12031 LACERATION REPAIR: ICD-10-PCS | Mod: S$GLB,,, | Performed by: PHYSICIAN ASSISTANT

## 2023-06-23 PROCEDURE — 73080 X-RAY EXAM OF ELBOW: CPT | Mod: LT,S$GLB,, | Performed by: RADIOLOGY

## 2023-06-23 PROCEDURE — 73502 XR HIP WITH PELVIS WHEN PERFORMED, 2 OR 3 VIEWS LEFT: ICD-10-PCS | Mod: LT,S$GLB,, | Performed by: RADIOLOGY

## 2023-06-23 RX ORDER — HYDROCODONE BITARTRATE AND ACETAMINOPHEN 7.5; 325 MG/1; MG/1
1 TABLET ORAL EVERY 6 HOURS PRN
Qty: 28 TABLET | Refills: 0 | Status: SHIPPED | OUTPATIENT
Start: 2023-06-23 | End: 2023-06-30

## 2023-06-23 RX ORDER — AMLODIPINE BESYLATE 2.5 MG/1
2.5 TABLET ORAL DAILY
COMMUNITY

## 2023-06-23 RX ORDER — MUPIROCIN 20 MG/G
OINTMENT TOPICAL 3 TIMES DAILY
Qty: 30 G | Refills: 2 | Status: SHIPPED | OUTPATIENT
Start: 2023-06-23

## 2023-06-23 RX ORDER — MUPIROCIN 20 MG/G
OINTMENT TOPICAL
Status: COMPLETED | OUTPATIENT
Start: 2023-06-23 | End: 2023-06-23

## 2023-06-23 RX ADMIN — MUPIROCIN: 20 OINTMENT TOPICAL at 11:06

## 2023-06-23 NOTE — PATIENT INSTRUCTIONS
You will need follow up in 1-2 weeks with orthopedist. Referral has been placed. Call  to schedule. You can weight bear as tolerated but may require a walker.    Clean wounds twice daily with regular dove or dial soap and water. Gently pat dry. Apply bactroban ointment and nonstick gauze 2x daily. Monitor for any spreading redness/swelling/purulent drainage - needs repeat evaluation. Staple removal in about 10-12 days.     Can take IBUPROFEN for pain and NORCO every 6 hours as needed for breakthrough pain (may cause drowsiness).

## 2023-06-23 NOTE — PROGRESS NOTES
"Subjective:      Patient ID: Argentina Cardoza is a 72 y.o. female.    Vitals:  height is 4' 8" (1.422 m) and weight is 51.3 kg (113 lb). Her oral temperature is 98.3 °F (36.8 °C). Her blood pressure is 126/67 and her pulse is 68. Her respiration is 16 and oxygen saturation is 100%.     Chief Complaint: Fall    Pt is a 73 yo female who presents to the clinic today with left shoulder pain, left elbow pain, left hip and pelvic pain, and left knee abrasion that began after a fall about 30 minutes ago. Pt states that she was walking her dog this morning, when the dog saw a squirrel and ran causing her to fall and get dragged by the leash. No head injury or LOC. Has abrasions and possible laceration to left elbow, abrasions to left hip and left knee. Tetanus is UTD. Has been ambulatory since incident     Fall  The accident occurred Less than 1 hour ago. The fall occurred while walking. Distance fallen: ground level. Impact surface: the street. The point of impact was the left shoulder, left hip, left knee and left elbow. The pain is present in the left elbow, left hip, left shoulder and left knee. The pain is at a severity of 4/10. The pain is mild. The symptoms are aggravated by standing and movement. Pertinent negatives include no abdominal pain, bowel incontinence, fever, headaches, hearing loss, hematuria, loss of consciousness, nausea, numbness, tingling, visual change or vomiting. She has tried nothing for the symptoms.     Constitution: Negative for chills, sweating, fatigue and fever.   HENT:  Negative for congestion, sinus pressure and sore throat.    Eyes:  Negative for eye discharge, eye itching and eye pain.   Respiratory:  Negative for cough, sputum production and shortness of breath.    Gastrointestinal:  Negative for abdominal pain, nausea, vomiting, diarrhea and bowel incontinence.   Genitourinary:  Negative for hematuria.   Musculoskeletal:  Positive for pain (left shoulder, left elbow, left hip and " left knee), trauma and joint pain. Negative for joint swelling and abnormal ROM of joint.   Skin:  Positive for wound, abrasion and laceration.   Neurological:  Negative for headaches, loss of consciousness and numbness.    Objective:     Physical Exam   Constitutional: She is oriented to person, place, and time. She appears well-developed. She is cooperative.  Non-toxic appearance. She does not appear ill. No distress.   HENT:   Head: Normocephalic and atraumatic. Head is without abrasion, without contusion and without laceration.   Ears:   Right Ear: Hearing, external ear and ear canal normal. No hemotympanum.   Left Ear: Hearing, external ear and ear canal normal. No hemotympanum.   Nose: Nose normal. No mucosal edema, rhinorrhea or nasal deformity. No epistaxis. Right sinus exhibits no maxillary sinus tenderness and no frontal sinus tenderness. Left sinus exhibits no maxillary sinus tenderness and no frontal sinus tenderness.   Mouth/Throat: Uvula is midline, oropharynx is clear and moist and mucous membranes are normal. No trismus in the jaw. Normal dentition. No uvula swelling. No posterior oropharyngeal erythema.   Eyes: Conjunctivae, EOM and lids are normal. Pupils are equal, round, and reactive to light. Right eye exhibits no discharge. Left eye exhibits no discharge. No scleral icterus.   Neck: Trachea normal and phonation normal. Neck supple. No tracheal deviation present. No neck rigidity present. No spinous process tenderness present. No muscular tenderness present.   Cardiovascular: Normal rate, regular rhythm, normal heart sounds and normal pulses.   Pulmonary/Chest: Effort normal and breath sounds normal. No respiratory distress.   Abdominal: Normal appearance and bowel sounds are normal. She exhibits no distension and no mass. Soft. There is no abdominal tenderness.   Musculoskeletal: Normal range of motion.         General: No deformity. Normal range of motion.      Comments: FROM left shoulder and  left knee without swelling or deformities. Just distal to left knee is superficial abrasion.    Left hip superficial road rash abrasion FROM active and passive but reproduces pain. No shortening. Posterior pelvic pain. No ecchymosis. 2+ DP pulse and normal sensation distally.    Left elbow with 3 inch superficial road rash abrasion with two separate deeper areas about 1 cm each extending to fascia but not through fascia. No apparent foreign body. FROM no deformities. 2+ radial and ulnar LUE. Normal sensation and ROM distally    Neurological: She is alert and oriented to person, place, and time. She has normal strength. No cranial nerve deficit or sensory deficit. She exhibits normal muscle tone. She displays no seizure activity. Coordination normal. GCS eye subscore is 4. GCS verbal subscore is 5. GCS motor subscore is 6.   Skin: Skin is warm, dry, intact, not diaphoretic and not pale. Capillary refill takes less than 2 seconds. No abrasion, No burn, No bruising and No ecchymosis   Psychiatric: Her speech is normal and behavior is normal. Judgment and thought content normal.   Nursing note and vitals reviewed.    XR ELBOW COMPLETE 3 VIEW LEFT    Result Date: 6/23/2023  EXAMINATION: XR ELBOW COMPLETE 3 VIEW LEFT CLINICAL HISTORY: Unspecified fall, initial encounter TECHNIQUE: AP, lateral, and oblique views of the left elbow were performed. COMPARISON: None FINDINGS: There is some subcutaneous soft tissue gas present overlying the dorsal aspect of the olecranon suggesting probable laceration with some associated local soft tissue edema suspected.  No acute fractures or dislocations visualized.  No definite joint effusion demonstrated.  Joint spaces are preserved.     Suspected soft tissue injury as above Electronically signed by: Randell Peraza MD Date:    06/23/2023 Time:    10:28    X-Ray Hip 2 or 3 views Left (with Pelvis when performed)    Result Date: 6/23/2023  EXAMINATION: XR HIP WITH PELVIS WHEN PERFORMED, 2  OR 3 VIEWS LEFT CLINICAL HISTORY: Unspecified fall, initial encounter TECHNIQUE: AP view of the pelvis and frog leg lateral view of the left hip were performed. COMPARISON: None FINDINGS: There is a nondisplaced fracture deformity seen involving the left superior pubic ramus near the pubic symphysis which is potentially subacute in nature.  Remaining visualized osseous structures appear intact.  No evidence of dislocation.  Hip joint spaces are preserved.     Left superior pubic ramus fracture as above This report was flagged in Epic as abnormal. Electronically signed by: Randell Peraza MD Date:    06/23/2023 Time:    10:26     I have reviewed the patient's previous visits, labs and images.    Assessment:     1. Fall, initial encounter    2. Closed fracture of superior ramus of left pubis, initial encounter    3. Laceration of left elbow, initial encounter    4. Abrasions of multiple sites    5. Hip strain, left, initial encounter    6. Sprain of left shoulder, unspecified shoulder sprain type, initial encounter        Plan:         Fall, initial encounter  -     X-Ray Hip 2 or 3 views Left (with Pelvis when performed); Future; Expected date: 06/23/2023  -     XR ELBOW COMPLETE 3 VIEW LEFT; Future; Expected date: 06/23/2023    Closed fracture of superior ramus of left pubis, initial encounter  -     X-Ray Hip 2 or 3 views Left (with Pelvis when performed); Future; Expected date: 06/23/2023  -     Ambulatory referral/consult to Orthopedics  -     HYDROcodone-acetaminophen (NORCO) 7.5-325 mg per tablet; Take 1 tablet by mouth every 6 (six) hours as needed for Pain.  Dispense: 28 tablet; Refill: 0    Laceration of left elbow, initial encounter  -     XR ELBOW COMPLETE 3 VIEW LEFT; Future; Expected date: 06/23/2023  -     mupirocin 2 % ointment  -     mupirocin (BACTROBAN) 2 % ointment; Apply topically 3 (three) times daily.  Dispense: 30 g; Refill: 2    Abrasions of multiple sites  -     mupirocin 2 % ointment  -      mupirocin (BACTROBAN) 2 % ointment; Apply topically 3 (three) times daily.  Dispense: 30 g; Refill: 2    Hip strain, left, initial encounter  -     X-Ray Hip 2 or 3 views Left (with Pelvis when performed); Future; Expected date: 06/23/2023    Sprain of left shoulder, unspecified shoulder sprain type, initial encounter    Mirella Hernandez PA-C  [facility]       Patient Instructions   You will need follow up in 1-2 weeks with orthopedist. Referral has been placed. Call  to schedule. You can weight bear as tolerated but may require a walker.    Clean wounds twice daily with regular dove or dial soap and water. Gently pat dry. Apply bactroban ointment and nonstick gauze 2x daily. Monitor for any spreading redness/swelling/purulent drainage - needs repeat evaluation. Staple removal in about 10-12 days.     Can take IBUPROFEN for pain and NORCO every 6 hours as needed for breakthrough pain (may cause drowsiness).         Medical Decision Making:   Initial Assessment:   Multiple abrasions and MSK pain after ground level fall.   Differential Diagnosis:   Fx, strain, abrasions  Independently Interpreted Test(s):   I have ordered and independently interpreted X-rays - see prior notes.  Clinical Tests:   Radiological Study: Ordered and Reviewed  Urgent Care Management:  Wounds cleaned, bactroban and nonstick bandage placed. 2 staples to small separate elbow deeper lacs.   Other:   I have discussed this case with another health care provider.       <> Summary of the Discussion: Case discussed with Dr. Duarte kent on call. States fracture is stable. Recommends weight bear as tolerated have follow up in 1-2 weeks.

## 2023-06-23 NOTE — PROCEDURES
Laceration Repair    Date/Time: 6/23/2023 9:30 AM  Performed by: Mirella Hernandez PA-C  Authorized by: Mirella Hernandez PA-C   Body area: upper extremity  Location details: left elbow  Laceration length: 1 (2 x 1 cm lacerations) cm  Foreign bodies: no foreign bodies  Tendon involvement: none  Nerve involvement: none  Vascular damage: no  Preparation: Patient was prepped and draped in the usual sterile fashion.  Irrigation solution: saline (with 1:1 hibiclens)  Irrigation method: syringe  Amount of cleaning: extensive  Debridement: minimal  Degree of undermining: minimal  Skin closure: staples  Number of sutures: 2 staples.  Approximation difficulty: simple  Dressing: antibiotic ointment and petrolatum gauze  Patient tolerance: Patient tolerated the procedure well with no immediate complications

## 2023-06-27 ENCOUNTER — TELEPHONE (OUTPATIENT)
Dept: ORTHOPEDICS | Facility: CLINIC | Age: 73
End: 2023-06-27
Payer: MEDICARE

## 2023-06-27 NOTE — TELEPHONE ENCOUNTER
Spoke with patient and she is following up with an Orthopedic physician that she has previously seen. Understanding verbalized.

## 2023-06-27 NOTE — TELEPHONE ENCOUNTER
----- Message from Allegra Kwon MA sent at 6/27/2023 10:13 AM CDT -----  [facility] 06/23/23 Closed fracture of superior ramus of left pubis

## 2023-07-03 ENCOUNTER — OFFICE VISIT (OUTPATIENT)
Dept: URGENT CARE | Facility: CLINIC | Age: 73
End: 2023-07-03
Payer: MEDICARE

## 2023-07-03 VITALS
RESPIRATION RATE: 16 BRPM | DIASTOLIC BLOOD PRESSURE: 65 MMHG | HEART RATE: 71 BPM | HEIGHT: 56 IN | SYSTOLIC BLOOD PRESSURE: 136 MMHG | BODY MASS INDEX: 25.42 KG/M2 | OXYGEN SATURATION: 100 % | TEMPERATURE: 98 F | WEIGHT: 113 LBS

## 2023-07-03 DIAGNOSIS — Z48.02 ENCOUNTER FOR STAPLE REMOVAL: Primary | ICD-10-CM

## 2023-07-03 PROCEDURE — 99499 UNLISTED E&M SERVICE: CPT | Mod: S$GLB,,, | Performed by: PHYSICIAN ASSISTANT

## 2023-07-03 PROCEDURE — 99499 NO LOS: ICD-10-PCS | Mod: S$GLB,,, | Performed by: PHYSICIAN ASSISTANT

## 2023-07-03 PROCEDURE — 99024 POSTOP FOLLOW-UP VISIT: CPT | Mod: S$GLB,POP,, | Performed by: PHYSICIAN ASSISTANT

## 2023-07-03 PROCEDURE — 99024 STAPLE REMOVAL: ICD-10-PCS | Mod: S$GLB,POP,, | Performed by: PHYSICIAN ASSISTANT

## 2023-07-03 NOTE — PROGRESS NOTES
"Subjective:      Patient ID: Argentina Cardoza is a 72 y.o. female.    Vitals:  height is 4' 8" (1.422 m) and weight is 51.3 kg (113 lb). Her oral temperature is 98.2 °F (36.8 °C). Her blood pressure is 136/65 and her pulse is 71. Her respiration is 16 and oxygen saturation is 100%.     Chief Complaint: No chief complaint on file.    Pt presents to the clinic today for staple removal from visit on 06/23. States area is healing well. No pain, swelling, fever. Small amount of yellowish drainage sometimes on bandage. Still applying Bactroban.     Suture / Staple Removal  The sutures were placed 7 to 10 days ago. She tried nothing since the wound repair. There has been colored (yellow) discharge from the wound. There is no redness present. There is no swelling present. There is no pain present. She has no difficulty moving the affected extremity or digit.     Constitution: Negative.   Gastrointestinal: Negative.    Musculoskeletal:  Negative for joint pain, joint swelling and abnormal ROM of joint.   Skin:  Positive for wound.   Neurological: Negative.     Objective:     Physical Exam   Constitutional: She appears well-developed.  Non-toxic appearance. She does not appear ill. No distress.   HENT:   Head: Normocephalic and atraumatic.   Ears:   Right Ear: External ear normal.   Left Ear: External ear normal.   Nose: Nose normal.   Eyes: Conjunctivae and EOM are normal.   Neck: Neck supple.   Pulmonary/Chest: Effort normal.   Abdominal: Normal appearance.   Musculoskeletal: Normal range of motion.         General: Normal range of motion.   Neurological: She is alert and at baseline. She displays no weakness.   Skin: Skin is warm, dry, not diaphoretic, not pale and no rash.        Psychiatric: Her behavior is normal.     Assessment:     1. Encounter for staple removal        Plan:     Continue bactroban 1-2 times per day. Keep area clean and dry. Monitor for any signs of infection.     Encounter for staple removal  - "     Staple Removal

## 2023-07-03 NOTE — PROCEDURES
Staple Removal    Date/Time: 7/3/2023 9:15 AM  Location procedure was performed: [facility]  Performed by: Sweetie Bee PA-C  Authorized by: Sweetie Bee PA-C   Body area: upper extremity  Location details: left elbow  Wound Appearance: clean, well healed, moist, nontender and nonpurulent  Staples Removed: 2  Post-removal: no dressing applied  Facility: sutures placed in this facility  Complications: No  Patient tolerance: Patient tolerated the procedure well with no immediate complications

## 2023-07-06 ENCOUNTER — TELEPHONE (OUTPATIENT)
Dept: URGENT CARE | Facility: CLINIC | Age: 73
End: 2023-07-06
Payer: MEDICARE

## 2023-08-03 ENCOUNTER — PATIENT MESSAGE (OUTPATIENT)
Dept: RESEARCH | Facility: HOSPITAL | Age: 73
End: 2023-08-03
Payer: MEDICARE

## 2024-12-19 ENCOUNTER — HOSPITAL ENCOUNTER (OUTPATIENT)
Dept: RADIOLOGY | Facility: CLINIC | Age: 74
Discharge: HOME | End: 2024-12-19
Attending: NURSE PRACTITIONER
Payer: MEDICARE

## 2024-12-19 ENCOUNTER — OFFICE VISIT (OUTPATIENT)
Dept: URGENT CARE | Facility: CLINIC | Age: 74
End: 2024-12-19
Payer: MEDICARE

## 2024-12-19 VITALS
DIASTOLIC BLOOD PRESSURE: 82 MMHG | SYSTOLIC BLOOD PRESSURE: 133 MMHG | RESPIRATION RATE: 18 BRPM | HEART RATE: 80 BPM | HEIGHT: 62 IN | WEIGHT: 110 LBS | OXYGEN SATURATION: 98 % | TEMPERATURE: 98 F | BODY MASS INDEX: 20.24 KG/M2

## 2024-12-19 DIAGNOSIS — R05.1 ACUTE COUGH: ICD-10-CM

## 2024-12-19 DIAGNOSIS — R09.89 CHEST CONGESTION: ICD-10-CM

## 2024-12-19 DIAGNOSIS — R52 BODY ACHES: ICD-10-CM

## 2024-12-19 DIAGNOSIS — R05.1 ACUTE COUGH: Primary | ICD-10-CM

## 2024-12-19 DIAGNOSIS — R09.82 POST-NASAL DRIP: ICD-10-CM

## 2024-12-19 PROCEDURE — 99214 OFFICE O/P EST MOD 30 MIN: CPT | Mod: S$GLB,,, | Performed by: NURSE PRACTITIONER

## 2024-12-19 PROCEDURE — 71046 X-RAY EXAM CHEST 2 VIEWS: CPT | Mod: S$GLB,,, | Performed by: RADIOLOGY

## 2024-12-19 RX ORDER — PROMETHAZINE HYDROCHLORIDE AND DEXTROMETHORPHAN HYDROBROMIDE 6.25; 15 MG/5ML; MG/5ML
5 SYRUP ORAL EVERY 6 HOURS PRN
Qty: 118 ML | Refills: 0 | Status: SHIPPED | OUTPATIENT
Start: 2024-12-19 | End: 2024-12-26

## 2024-12-19 RX ORDER — BENZONATATE 200 MG/1
200 CAPSULE ORAL 3 TIMES DAILY PRN
Qty: 30 CAPSULE | Refills: 0 | Status: SHIPPED | OUTPATIENT
Start: 2024-12-19 | End: 2024-12-29

## 2024-12-19 RX ORDER — ALBUTEROL SULFATE 90 UG/1
1-2 INHALANT RESPIRATORY (INHALATION) EVERY 4 HOURS PRN
Qty: 18 G | Refills: 0 | Status: SHIPPED | OUTPATIENT
Start: 2024-12-19

## 2024-12-19 NOTE — PROGRESS NOTES
"Subjective:      Patient ID: [place] Cardoza is a 74 y.o. female.    Vitals:  height is 5' 2" (1.575 m) and weight is 49.9 kg (110 lb). Her oral temperature is 98.1 °F (36.7 °C). Her blood pressure is 133/82 and her pulse is 80. Her respiration is 18 and oxygen saturation is 98%.     Chief Complaint: Cough    74 year old female presents for evaluation of cough, chest congestion, and upper back pain attributed to excessive coughing. Symptom onset 10 days ago with Laryngitis which initially improved but worsened over the past week. Complains of increased cough at night. OTC Tylenol, Advil, Robitussin DM, Sudaphed, and Phenylephrine with some relief.     Cough  This is a new problem. The current episode started 1 to 4 weeks ago. The problem occurs constantly. The cough is Productive of sputum. Associated symptoms include postnasal drip. Pertinent negatives include no chills, ear pain, fever, myalgias, sore throat or shortness of breath. The symptoms are aggravated by lying down. She has tried OTC cough suppressant (Robitussin,Sudafed) for the symptoms. The treatment provided mild relief. Her past medical history is significant for bronchitis and pneumonia. There is no history of asthma, bronchiectasis, COPD, emphysema or environmental allergies.       Constitution: Positive for appetite change and fatigue (mild). Negative for chills, sweating and fever.   HENT:  Positive for congestion (with onset now resolved) and postnasal drip. Negative for ear pain, sinus pain, sinus pressure and sore throat.    Neck: neck negative.   Cardiovascular: Negative.    Eyes: Negative.    Respiratory:  Positive for chest tightness, cough and sputum production (thick dark green). Negative for shortness of breath.    Gastrointestinal: Negative.  Negative for nausea, vomiting and diarrhea.   Endocrine: negative.   Genitourinary: Negative.    Musculoskeletal:  Positive for back pain (upper back). Negative for muscle ache.   Skin: " Negative.    Allergic/Immunologic: Positive for sneezing. Negative for environmental allergies.   Neurological: Negative.    Hematologic/Lymphatic: Negative.    Psychiatric/Behavioral: Negative.        Objective:     Physical Exam   Constitutional: She is oriented to person, place, and time. She appears well-developed. She is cooperative.  Non-toxic appearance. She does not appear ill. No distress. She is not intubated. normalawake  HENT:   Head: Normocephalic and atraumatic.   Ears:   Right Ear: Hearing, tympanic membrane, external ear and ear canal normal. Tympanic membrane is not injected, not scarred, not perforated, not erythematous, not retracted and not bulging. no impacted cerumen  Left Ear: Hearing, tympanic membrane, external ear and ear canal normal. Tympanic membrane is not injected, not scarred, not perforated, not erythematous, not retracted and not bulging. no impacted cerumen  Nose: Mucosal edema present. No rhinorrhea or nasal deformity. No epistaxis. Right sinus exhibits no maxillary sinus tenderness and no frontal sinus tenderness. Left sinus exhibits no maxillary sinus tenderness and no frontal sinus tenderness.   Mouth/Throat: Uvula is midline, oropharynx is clear and moist and mucous membranes are normal. Mucous membranes are moist. No trismus in the jaw. Normal dentition. No uvula swelling. Cobblestoning present. No oropharyngeal exudate, posterior oropharyngeal edema, posterior oropharyngeal erythema or tonsillar abscesses. Tonsils are 0 on the right. Tonsils are 0 on the left. No tonsillar exudate.   Eyes: Conjunctivae and lids are normal. Pupils are equal, round, and reactive to light. Right eye exhibits no discharge. Left eye exhibits no discharge. No scleral icterus. Extraocular movement intact   Neck: Trachea normal and phonation normal. Neck supple. No edema present. No erythema present. No neck rigidity present.   Cardiovascular: Normal rate, regular rhythm, normal heart sounds and  normal pulses.   Pulmonary/Chest: Effort normal and breath sounds normal. No accessory muscle usage or stridor. No apnea, no tachypnea and no bradypnea. She is not intubated. No respiratory distress. She has no decreased breath sounds. She has no wheezes. She has no rhonchi. She has no rales. She exhibits no tenderness.   Abdominal: Normal appearance.   Musculoskeletal: Normal range of motion.         General: No deformity. Normal range of motion.   Neurological: no focal deficit. She is alert and oriented to person, place, and time. She exhibits normal muscle tone. Coordination normal.   Skin: Skin is warm, dry, intact, not diaphoretic and not pale.   Psychiatric: Her speech is normal and behavior is normal. Mood, judgment and thought content normal.   Nursing note and vitals reviewed.  XR CHEST PA AND LATERAL    Result Date: 12/19/2024  EXAM: XR CHEST PA AND LATERAL CLINICAL HISTORY: Cough COMPARISON: None available. FINDINGS: 2 view chest. Heart size is normal and lungs are clear bilaterally.  Pulmonary vasculature is normal.  Bilateral breast implants with calcification involving the implant capsules are visible bilaterally.     No evidence of acute disease. Finalized on: 12/19/2024 9:59 AM By:  Ayaan Roberts [facility]# [id number]      2024-12-19 10:01:[id number]    [facility]       Assessment:     1. Acute cough    2. Chest congestion    3. Body aches    4. Post-nasal drip        Plan:       Acute cough  -     benzonatate (TESSALON) 200 MG capsule; Take 1 capsule (200 mg total) by mouth 3 (three) times daily as needed for Cough.  Dispense: 30 capsule; Refill: 0  -     promethazine-dextromethorphan (PROMETHAZINE-DM) 6.25-15 mg/5 mL Syrp; Take 5 mLs by mouth every 6 (six) hours as needed (cough).  Dispense: 118 mL; Refill: 0  -     XR CHEST PA AND LATERAL; Future; Expected date: 12/19/2024  -     albuterol (VENTOLIN HFA) 90 mcg/actuation inhaler; Inhale 1-2 puffs into the lungs every 4 (four) hours as needed for Wheezing or  Shortness of Breath. Rescue  Dispense: 18 g; Refill: 0    Chest congestion  -     XR CHEST PA AND LATERAL; Future; Expected date: 12/19/2024  -     albuterol (VENTOLIN HFA) 90 mcg/actuation inhaler; Inhale 1-2 puffs into the lungs every 4 (four) hours as needed for Wheezing or Shortness of Breath. Rescue  Dispense: 18 g; Refill: 0    Body aches    Post-nasal drip           Patient presents with symptoms and examination that are consistent with acute viral illness. Decision to perform CXR to rule out pneumonia, (-) findings discussed. Exam negative for otitis media/bacterial sinusitis/bacterial tonsillitis/pneumonia. Plan is to manage symptoms, prevent worsening, and follow up as needed/with worsening. Discussed with patient who verbalizes understanding.      Patient Instructions   Rest  Hydration/increase fluids  Steam/hot showers  OTC antihistamines as directed for post nasal drip  Mucinex OTC as directed for chest congestion  Tessalon Perles 3 times daily as needed for cough  Phenergan DM every 6 hours as needed for cough (Caution drowsiness)  Albuterol 1-2 puffs every 4-6 hours as needed  Typical course and duration of illness discussed  Signs and symptoms of worsening discussed  Follow up as needed/with worsening

## 2024-12-19 NOTE — PATIENT INSTRUCTIONS
Rest  Hydration/increase fluids  Steam/hot showers  OTC antihistamines as directed for post nasal drip  Mucinex OTC as directed for chest congestion  Tessalon Perles 3 times daily as needed for cough  Phenergan DM every 6 hours as needed for cough (Caution drowsiness)  Albuterol 1-2 puffs every 4-6 hours as needed  Typical course and duration of illness discussed  Signs and symptoms of worsening discussed  Follow up as needed/with worsening

## 2025-01-06 ENCOUNTER — TELEPHONE (OUTPATIENT)
Dept: SURGICAL ONCOLOGY | Facility: CLINIC | Age: 75
End: 2025-01-06
Payer: MEDICARE

## 2025-01-06 NOTE — TELEPHONE ENCOUNTER
Received call from patient. Patient heard about Dr. Kline through a friend and would like to schedule an appointment. BCC to the scalp.  Appointment scheduled for 1/15/25. Directions provided to Cancer center and direct number provided. Patient verbalized understanding.

## 2025-01-15 ENCOUNTER — OFFICE VISIT (OUTPATIENT)
Dept: SURGICAL ONCOLOGY | Facility: CLINIC | Age: 75
End: 2025-01-15
Payer: MEDICARE

## 2025-01-15 VITALS
BODY MASS INDEX: 20.63 KG/M2 | HEART RATE: 76 BPM | DIASTOLIC BLOOD PRESSURE: 78 MMHG | SYSTOLIC BLOOD PRESSURE: 131 MMHG | HEIGHT: 62 IN | TEMPERATURE: 98 F | WEIGHT: 112.13 LBS

## 2025-01-15 DIAGNOSIS — D22.10 NEVUS OF LEFT EYELID: ICD-10-CM

## 2025-01-15 DIAGNOSIS — C44.41 BASAL CELL CARCINOMA, SCALP/NECK: Primary | ICD-10-CM

## 2025-01-15 PROCEDURE — 99204 OFFICE O/P NEW MOD 45 MIN: CPT | Mod: S$PBB,,, | Performed by: SURGERY

## 2025-01-15 PROCEDURE — 99999 PR PBB SHADOW E&M-EST. PATIENT-LVL IV: CPT | Mod: PBBFAC,,, | Performed by: SURGERY

## 2025-01-15 PROCEDURE — 99214 OFFICE O/P EST MOD 30 MIN: CPT | Mod: PBBFAC | Performed by: SURGERY

## 2025-01-15 NOTE — PROGRESS NOTES
Surgical Oncology Clinic Note      Referring Provider: Dr. Jose Miguel Salinas   PCP: Amaya Dozier MD    Reason For Visit: BCC of the right frontal scalp    HISTORY OF PRESENT ILLNESS     Argentina Cardoza is a 74 y.o. female with history of SVT who presents today for evaluation of a basal cell carcinoma of the scalp.  The patient has been referred by Dr. Salinas with dermatology at the [facility] for further treatment recommendations.  The lesion was noted on her routine dermatologic evaluation.  She states she hit her head on the car sometime this past summer and it did scab over and peeled off and was itchy.  There was no other clinical history of of bleeding, trauma, development of satellite lesions or lymphadenopathy. The patient does not report any history of new lumps, bumps, aside from the index lesion, and there was no unexplained pain, weight loss or fatigue.  She does note a spot on the posterior left ear on her scalp that was found by her hairstylist that has not been evaluated by dermatology and has not been biopsied yet.       On presentation to the local dermatologist, a comprehensive skin examination was fully performed. A shave biopsy of the lesion was performed that revealed:  basal cell carcinoma.  There were not any additional lesions biopsied.  She was originally referred to Dr. Shelton for Mohs micrographic surgery however could back in to see him until March of this year.  She is currently scheduled to see Dr. Haas in February for Mohs but was referred to me by a friend.    Dark olive skin, brown eyes.  Not burned a lot as a child, but did allison. Carlson as an adult.   No relatives with melanoma, mother had skin cancer.  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 Ibarra in [place] back in 2015.  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  She still follows with him annually for surveillance of this intradermal nevus.       The patient reports an excellent performance status. There have been no issues with prior anesthesia events. There is no prior personal or family history of bleeding or clots. There is no personal history of diabetes, heart attack or stroke.      Past Medical History:   Diagnosis Date    BRCA gene mutation negative     Hyperlipidemia     Hypertension     Osteoarthritis     Osteopenia     SVT (supraventricular tachycardia)        Past Surgical History:   Procedure Laterality Date    APPENDECTOMY      AUGMENTATION OF BREAST      CARPAL TUNNEL RELEASE      DILATION AND CURETTAGE OF UTERUS      HAND SURGERY      heart ablation      NASAL SEPTOPLASTY      RHINOPLASTY      TOTAL ABDOMINAL HYSTERECTOMY W/ BILATERAL SALPINGOOPHORECTOMY      Fibroids       Family History   Problem Relation Name Age of Onset    Breast cancer Mother      Colon cancer Mother      Cervical cancer Mother      Heart attack Father      Breast cancer Maternal Aunt         Social History     Socioeconomic History    Marital status:    Tobacco Use    Smoking status: Never    Smokeless tobacco: Never   Substance and Sexual Activity    Alcohol use: Yes     Comment: occ    Drug use: Never     Social Drivers of Health     Financial Resource Strain: Low Risk  (1/8/2025)    Overall Financial Resource Strain (CARDIA)     Difficulty of Paying Living Expenses: Not very hard   Food Insecurity: No Food Insecurity (1/8/2025)    Hunger Vital Sign     Worried About Running Out of Food in the Last Year: Never true     Ran Out of Food in the Last Year: Never true   Transportation Needs: No Transportation Needs (5/28/2024)    Received from Loc[facility] within [facility] Subsidiaries and Affiliates    PRAPARE - Transportation     Lack of Transportation (Medical): No     Lack of Transportation (Non-Medical): No   Physical Activity:  Insufficiently Active (1/8/2025)    Exercise Vital Sign     Days of Exercise per Week: 2 days     Minutes of Exercise per Session: 10 min   Stress: No Stress Concern Present (1/8/2025)    [employer] - Occupational Stress Questionnaire     Feeling of Stress : Only a little   Housing Stability: Unknown (1/8/2025)    Housing Stability Vital Sign     Unable to Pay for Housing in the Last Year: No          Medication List            Accurate as of January 15, 2025 11:59 PM. If you have any questions, ask your nurse or doctor.                CONTINUE taking these medications      albuterol 90 mcg/actuation inhaler  Commonly known as: VENTOLIN HFA  Inhale 1-2 puffs into the lungs every 4 (four) hours as needed for Wheezing or Shortness of Breath. Rescue     estradioL 0.025 mg/24 hr  Commonly known as: VIVELLE-DOT  UNWRAP AND APPLY 1 PATCH ONTO THE SKIN TWICE A WEEK     rosuvastatin 5 MG tablet  Commonly known as: CRESTOR              Review of patient's allergies indicates:   Allergen Reactions    Insect venom Itching and Rash    Sulfa (sulfonamide antibiotics) Itching and Rash       Review of Systems   Constitutional:  Negative for chills, fever, malaise/fatigue and weight loss.   Respiratory:  Negative for cough, shortness of breath and wheezing.    Cardiovascular:  Negative for chest pain and palpitations.   Gastrointestinal:  Negative for abdominal pain, constipation, diarrhea, nausea and vomiting.   Musculoskeletal:  Negative for back pain, falls and joint pain.   Neurological:  Negative for dizziness, sensory change, speech change, focal weakness, seizures, loss of consciousness and weakness.   Psychiatric/Behavioral:  Negative for depression and hallucinations. The patient is not nervous/anxious.        PHYSICAL EXAM     Vitals:    01/15/25 1301   BP: 131/78   Pulse: 76   Temp: 97.9 °F (36.6 °C)     Body mass index is 20.5 kg/m².  ECOG SCORE    0 - Fully active-able to carry on all  "pre-disease performance without restriction       Physical Exam  Vitals reviewed.   Constitutional:       General: She is not in acute distress.     Appearance: Normal appearance.   HENT:      Head: Normocephalic and atraumatic.      Mouth/Throat:      Mouth: Mucous membranes are moist.   Eyes:      General: No scleral icterus.     Extraocular Movements: Extraocular movements intact.      Conjunctiva/sclera: Conjunctivae normal.   Pulmonary:      Effort: Pulmonary effort is normal.   Abdominal:      General: There is no distension.      Palpations: Abdomen is soft. There is no mass.      Tenderness: There is no abdominal tenderness.   Musculoskeletal:         General: No swelling. Normal range of motion.   Skin:     General: Skin is warm and dry.      Comments: Scaly lesion on the right anterior scalp, no pigmentation, several Cm in size.  Additional lesion on posterior left ear with increased melanocytic pigmentation.     Neurological:      General: No focal deficit present.      Mental Status: She is alert.   Psychiatric:         Mood and Affect: Mood normal.         Behavior: Behavior normal.         Thought Content: Thought content normal.         Judgment: Judgment normal.                    DATA REVIEW:  I personally reviewed the following records for this visit: lab work from prior visit, notes from other physicians, surgical pathology results, radiographic study evaluation, and laboratory results done by primary care physician    LABS     No results found for: "WBC", "HGB", "HCT", "PLT"  Lab Results   Component Value Date    CALCIUM 10.3 07/22/2020     07/22/2020    K 4.4 07/22/2020    BUN 14 07/22/2020    CREATININE 0.72 07/22/2020       PATHOLOGY      FINAL PATHOLOGIC DIAGNOSIS     Right frontal scalp: Basal cell carcinoma                               MICROSCOPIC DESCRIPTION:                                                A nested dermal proliferation of basaloid cells has peripheral nuclear "   crowding, small hyperchromatic nuclei, and small collection of          apoptotic tumor cells.                                                                                       Ty Bird D.O.                                                     [facility] Pathologist, Electronic Signature     IMAGING     None    ASSESSMENT & PLAN     1. Basal cell carcinoma, scalp/neck    2. Nevus of left eyelid       Argentina Cardoza is a 74 y.o. female with BCC of the scalp and an intradermal nevus of the left eyelid.  There is no adenopathy on physical exam.      I have discussed with her the management of skin cancers varying from basal cell to squamous cell to melanoma.  We discussed that the standard of care as a spasm by the NCCN guidelines for basal cell carcinoma is for excision.  I did discuss with her that the risk of spread or metastatic disease in the basal cell carcinoma this is exceedingly low and as such evaluation of lymph nodes is not routinely recommended.  I did tell her that I think based on the location of her lesion and the pathology being a basal cell carcinoma the optimal management of this lesion would be for Mohs micrographic surgery.  We did discuss the option of a wide excision with a several mm margin however I do think that would be more morbid and would certainly impact her cosmetics more than it is probably necessary in this region which she is certainly in agreement with.  I also reassured her that it is very reasonable to wait a few weeks until Dr. Haas was available to do her Mohs surgery and the risks of this growing significantly or spreading in that time period is almost zero.  I have discussed that the recommendations are based upon the current guidelines espoused by the National Comprehensive Cancer Network.    We also discussed her eyelid nevus and the management of that.  I explained to her that I am not an expert in the eyes and facial lesions however have discussed her  situation with Dr. Lynch who was part of our ENT Oncology/Oculoplastics program and he is happy to evaluate her and follow her for this eyelid nevus.      I have also advised the patient on the importance of sun safety, to use sun screen and UV-blocking sun glasses when out of the house, to continue to see her local dermatologist for serial skin checks. I have advised the patient to call even if in between visits if there is the development of new or changing skin lesions, new lumps or bumps, subcutaneous nodules or masses, lymphadenopathy, or any unexplained pain, weight loss or fatigue. The patient voiced understanding of these recommendations, and all questions were answered.      -- Moh's with Dr. Haas on 2/11/25  -- Referral to Dr. Lynch for surveillance of eyelid nevus  -- follow up with Dr. Salinas or Dr. Silva for evaluation of lesion on posterior left ear     Ms. Cardoza expressed understanding in regards to our discussion today. Many good questions were asked on today's visit, all of which were answered to their satisfaction.    Follow-up: Follow up as needed.                  Krista Kline MD MS              Surgical Oncology              [facility] [place], [place]              Office: [phone]     Communications: 45 minutes were spent on today's visit in face-to-face and non face-to-face time with the patient. This patient was recently diagnosed with BCC of the scalp and the time was required to provide counseling and guidance regarding their new diagnosis. Time was spent reviewing all outside records and information pertaining to their work-up and formulating a treatment plan in line with standardized guidelines. Additional time was spent communicating with referring physicians and facilities to facilitate the efficient exchange of previous healthcare records and radiographic imaging pertinent to the diagnosis and disease management.       Orders Placed This  Encounter   Procedures    Ambulatory referral/consult to ENT     Standing Status:   Future     Standing Expiration Date:   2/15/2026     Referral Priority:   Routine     Referral Type:   Consultation     Referral Reason:   Specialty Services Required     Referred to Provider:   Darhsan Lynch MD     Requested Specialty:   Otolaryngology     Number of Visits Requested:   1

## 2025-07-10 ENCOUNTER — OFFICE VISIT (OUTPATIENT)
Dept: URGENT CARE | Facility: CLINIC | Age: 75
End: 2025-07-10
Payer: MEDICARE

## 2025-07-10 VITALS
WEIGHT: 111.13 LBS | HEIGHT: 62 IN | SYSTOLIC BLOOD PRESSURE: 128 MMHG | BODY MASS INDEX: 20.45 KG/M2 | HEART RATE: 67 BPM | DIASTOLIC BLOOD PRESSURE: 60 MMHG | OXYGEN SATURATION: 99 % | TEMPERATURE: 98 F | RESPIRATION RATE: 16 BRPM

## 2025-07-10 DIAGNOSIS — R82.90 ABNORMAL URINALYSIS: ICD-10-CM

## 2025-07-10 DIAGNOSIS — N76.0 ACUTE VAGINITIS: Primary | ICD-10-CM

## 2025-07-10 DIAGNOSIS — R30.0 DYSURIA: ICD-10-CM

## 2025-07-10 DIAGNOSIS — N89.8 VAGINAL ITCHING: ICD-10-CM

## 2025-07-10 DIAGNOSIS — N89.8 VAGINAL IRRITATION: ICD-10-CM

## 2025-07-10 LAB
BILIRUBIN, UA POC OHS: NEGATIVE
BLOOD, UA POC OHS: ABNORMAL
CLARITY, UA POC OHS: CLEAR
COLOR, UA POC OHS: YELLOW
GLUCOSE, UA POC OHS: NEGATIVE
KETONES, UA POC OHS: NEGATIVE
LEUKOCYTES, UA POC OHS: ABNORMAL
NITRITE, UA POC OHS: NEGATIVE
PH, UA POC OHS: 7
PROTEIN, UA POC OHS: NEGATIVE
SPECIFIC GRAVITY, UA POC OHS: 1.01
UROBILINOGEN, UA POC OHS: 0.2

## 2025-07-10 PROCEDURE — 87186 SC STD MICRODIL/AGAR DIL: CPT | Performed by: PHYSICIAN ASSISTANT

## 2025-07-10 PROCEDURE — 81515 NFCT DS BV&VAGINITIS DNA ALG: CPT | Performed by: PHYSICIAN ASSISTANT

## 2025-07-10 RX ORDER — PHENAZOPYRIDINE HYDROCHLORIDE 200 MG/1
200 TABLET, FILM COATED ORAL 3 TIMES DAILY PRN
Qty: 6 TABLET | Refills: 0 | Status: SHIPPED | OUTPATIENT
Start: 2025-07-10 | End: 2025-07-12

## 2025-07-10 NOTE — PROGRESS NOTES
"Subjective:      Patient ID: [place] Cardoza is a 74 y.o. female.    Vitals:  height is 5' 2" (1.575 m) and weight is 50.4 kg (111 lb 1.8 oz). Her oral temperature is 98.1 °F (36.7 °C). Her blood pressure is 128/60 and her pulse is 67. Her respiration is 16 and oxygen saturation is 99%.     Chief Complaint: Dysuria    Patient present for dysuria, vaginal irritation and vaginal itching. Symptoms started Sunday after being in swimming pool. Seemed to get better for a day or so but then returned worse last night.  Reports burning pain especially after urination.  States that she does have some pelvic pressure at the end of voiding.  Contacted her gynecologist regarding her symptoms and says Diflucan was sent in for her today but she has not yet started it.  Has appointment with her gynecologist on Monday.  Denies blood in the urine, back pain, flank pain. No hx of recurrent UTI.  Does have history of stress incontinence. Reports possible vaginal discharge but unsure if it was related to bladder leaking.     Dysuria   This is a new problem. The problem occurs intermittently. The quality of the pain is described as burning. The pain is at a severity of 3/10. The patient is experiencing no pain. There has been no fever. She is Sexually active. There is No history of pyelonephritis. Associated symptoms include a discharge, frequency and bubble bath use. Pertinent negatives include no behavior changes, chills, flank pain, hematuria, hesitancy, nausea, possible pregnancy, sweats, urgency, vomiting, weight loss, constipation, rash or withholding. The treatment provided no relief. There is no history of catheterization, diabetes insipidus, diabetes mellitus, genitourinary reflux, hypertension, kidney stones, recurrent UTIs, a single kidney, STD, urinary stasis or a urological procedure.       Constitution: Negative for chills and fever.   Gastrointestinal:  Negative for abdominal pain, nausea, vomiting and constipation. "   Genitourinary:  Positive for dysuria, frequency and vaginal pain. Negative for urgency, flank pain, hematuria, history of kidney stones, vaginal odor and genital sore.   Musculoskeletal:  Negative for back pain.   Skin: Negative.  Negative for rash.   Neurological: Negative.       Objective:     Physical Exam   Constitutional: She appears well-developed.  Non-toxic appearance. She does not appear ill. No distress.   HENT:   Head: Normocephalic and atraumatic.   Ears:   Right Ear: External ear normal.   Left Ear: External ear normal.   Nose: Nose normal.   Eyes: Conjunctivae and EOM are normal.   Neck: Neck supple.   Pulmonary/Chest: Effort normal.   Abdominal: Normal appearance. She exhibits no distension. Soft. flat abdomen There is no abdominal tenderness. There is no guarding.   Genitourinary:         Comments: Deferred; pt opted to self swab     Musculoskeletal: Normal range of motion.         General: Normal range of motion.   Neurological: no focal deficit. She is alert. She displays no weakness. Gait normal.   Skin: Skin is warm, dry, not diaphoretic, not pale and no rash.   Psychiatric: Her behavior is normal.     Results for orders placed or performed in visit on 07/10/25   POCT Urinalysis(Instrument)    Collection Time: 07/10/25 10:15 AM   Result Value Ref Range    Color, POC UA Yellow Yellow, Straw, Colorless    Clarity, POC UA Clear Clear    Glucose, POC UA Negative Negative    Bilirubin, POC UA Negative Negative    Ketones, POC UA Negative Negative    Spec Grav POC UA 1.015 1.005 - 1.030    Blood, POC UA Small (A) Negative    pH, POC UA 7.0 5.0 - 8.0    Protein, POC UA Negative Negative    Urobilinogen, POC UA 0.2 <=1.0    Nitrite, POC UA Negative Negative    WBC, POC UA Small (A) Negative       Assessment:     1. Acute vaginitis    2. Dysuria    3. Vaginal itching    4. Vaginal irritation    5. Abnormal urinalysis        Plan:       Acute vaginitis  -     Vaginosis Screen by DNA Probe    Dysuria  -      POCT Urinalysis(Instrument)  -     Urine Culture High Risk ($$)  -     Vaginosis Screen by DNA Probe  -     phenazopyridine (PYRIDIUM) 200 MG tablet; Take 1 tablet (200 mg total) by mouth 3 (three) times daily as needed for Pain.  Dispense: 6 tablet; Refill: 0    Vaginal itching  -     Vaginosis Screen by DNA Probe    Vaginal irritation  -     POCT Urinalysis(Instrument)  -     Urine Culture High Risk ($$)  -     Vaginosis Screen by DNA Probe    Abnormal urinalysis  -     Urine Culture High Risk ($$)  -     Vaginosis Screen by DNA Probe          Medical Decision Making:   Differential Diagnosis:   UTI, yeast vaginitis, BV    Urgent Care Management:  Sounds like there yeast component based on symptoms. Given trace blood and WBC on UA will send culture to r/o UTI as source of dysuria. Discussed starting on abx now while waiting for culture results vs. Starting diflucan for symptoms and holding off on abx unless culture shows indication for them. Pt would like to wait on abx tx at this time and will wait for culture results.     · A urine culture was ordered, we will notify you of the results and if any changes need to be made to your treatment. Antibiotics may need to be prescribed pending urine culture results.   · If you were prescribed Pyridium, take as needed for urinary pain. If you were not prescribed this, and you do not have any history of kidney disease, you can take over-the- counter AZO for pain. Both Pyridium and AZO can turn your urine a orange/red color.   · Take Fluconazole as prescribed by your Gynecologist  · Please drink plenty of fluids and avoid caffeine or sugary beverages.   · Avoid bubble baths and hot tubs/jacuzzi.  · Avoid wearing tight and/or wet clothing.  · Wipe front to back when using restroom.  · If sexually active, make sure to urinate at least 20 minutes after having intercourse.   · Please follow up with your primary care provider if symptoms do not improve within 5 days or sooner  for any new or worsening symptoms.

## 2025-07-10 NOTE — PATIENT INSTRUCTIONS
A urine culture was ordered, we will notify you of the results and if any changes need to be made to your treatment. Antibiotics may need to be prescribed pending urine culture results.   If you were prescribed Pyridium, take as needed for urinary pain. If you were not prescribed this, and you do not have any history of kidney disease, you can take over-the- counter AZO for pain. Both Pyridium and AZO can turn your urine a orange/red color.   Take Fluconazole as prescribed by your Gynecologist  Please drink plenty of fluids and avoid caffeine or sugary beverages.   Avoid bubble baths and hot tubs/jacuzzi.  Avoid wearing tight and/or wet clothing.  Wipe front to back when using restroom.  If sexually active, make sure to urinate at least 20 minutes after having intercourse.   Please follow up with your primary care provider if symptoms do not improve within 5 days or sooner for any new or worsening symptoms.

## 2025-07-12 LAB — BACTERIA UR CULT: ABNORMAL

## 2025-07-14 ENCOUNTER — TELEPHONE (OUTPATIENT)
Dept: URGENT CARE | Facility: CLINIC | Age: 75
End: 2025-07-14
Payer: MEDICARE

## 2025-07-14 DIAGNOSIS — N39.0 URINARY TRACT INFECTION WITHOUT HEMATURIA, SITE UNSPECIFIED: Primary | ICD-10-CM

## 2025-07-14 RX ORDER — NITROFURANTOIN 25; 75 MG/1; MG/1
100 CAPSULE ORAL 2 TIMES DAILY
Qty: 10 CAPSULE | Refills: 0 | Status: SHIPPED | OUTPATIENT
Start: 2025-07-14 | End: 2025-07-19

## 2025-07-14 NOTE — TELEPHONE ENCOUNTER
Inform patient of urine culture results.  Nitrofurantoin sent to pharmacy for UTI.  She reports improvement of symptoms since starting on Diflucan after clinic visit.  Vaginosis results still pending.

## 2025-07-15 ENCOUNTER — TELEPHONE (OUTPATIENT)
Dept: URGENT CARE | Facility: CLINIC | Age: 75
End: 2025-07-15
Payer: MEDICARE

## 2025-07-15 NOTE — TELEPHONE ENCOUNTER
Patient called inquiring about vaginosis collected in clinic. Patient stated she could not verify the labs on [facility]. Informed patient labs would not show on her end until the lab is finalized. Also informed patient provider on duty will call with results once it finalizes.